# Patient Record
Sex: FEMALE | Race: ASIAN | NOT HISPANIC OR LATINO | URBAN - METROPOLITAN AREA
[De-identification: names, ages, dates, MRNs, and addresses within clinical notes are randomized per-mention and may not be internally consistent; named-entity substitution may affect disease eponyms.]

---

## 2020-02-04 ENCOUNTER — EMERGENCY (EMERGENCY)
Facility: HOSPITAL | Age: 20
LOS: 1 days | Discharge: HOME | End: 2020-02-04
Attending: EMERGENCY MEDICINE
Payer: SELF-PAY

## 2020-02-04 PROCEDURE — 99283 EMERGENCY DEPT VISIT LOW MDM: CPT

## 2020-02-04 PROCEDURE — 99053 MED SERV 10PM-8AM 24 HR FAC: CPT

## 2020-02-05 VITALS
WEIGHT: 134.92 LBS | OXYGEN SATURATION: 99 % | DIASTOLIC BLOOD PRESSURE: 84 MMHG | HEART RATE: 89 BPM | SYSTOLIC BLOOD PRESSURE: 125 MMHG | TEMPERATURE: 98 F | HEIGHT: 64 IN | RESPIRATION RATE: 16 BRPM

## 2020-02-05 PROCEDURE — 90715 TDAP VACCINE 7 YRS/> IM: CPT

## 2020-02-05 PROCEDURE — 99283 EMERGENCY DEPT VISIT LOW MDM: CPT | Mod: 25

## 2020-02-05 PROCEDURE — 73130 X-RAY EXAM OF HAND: CPT | Mod: 26,RT

## 2020-02-05 PROCEDURE — 90471 IMMUNIZATION ADMIN: CPT

## 2020-02-05 PROCEDURE — 73130 X-RAY EXAM OF HAND: CPT

## 2020-02-05 RX ORDER — TETANUS TOXOID, REDUCED DIPHTHERIA TOXOID AND ACELLULAR PERTUSSIS VACCINE, ADSORBED 5; 2.5; 8; 8; 2.5 [IU]/.5ML; [IU]/.5ML; UG/.5ML; UG/.5ML; UG/.5ML
0.5 SUSPENSION INTRAMUSCULAR ONCE
Refills: 0 | Status: COMPLETED | OUTPATIENT
Start: 2020-02-05 | End: 2020-02-05

## 2020-02-05 RX ORDER — IBUPROFEN 200 MG
1 TABLET ORAL
Qty: 20 | Refills: 0
Start: 2020-02-05

## 2020-02-05 RX ORDER — IBUPROFEN 200 MG
400 TABLET ORAL ONCE
Refills: 0 | Status: COMPLETED | OUTPATIENT
Start: 2020-02-05 | End: 2020-02-05

## 2020-02-05 RX ORDER — BACITRACIN ZINC 500 UNIT/G
1 OINTMENT IN PACKET (EA) TOPICAL
Qty: 1 | Refills: 0
Start: 2020-02-05 | End: 2020-02-09

## 2020-02-05 RX ADMIN — Medication 1 TABLET(S): at 02:46

## 2020-02-05 RX ADMIN — Medication 400 MILLIGRAM(S): at 02:46

## 2020-02-05 RX ADMIN — TETANUS TOXOID, REDUCED DIPHTHERIA TOXOID AND ACELLULAR PERTUSSIS VACCINE, ADSORBED 0.5 MILLILITER(S): 5; 2.5; 8; 8; 2.5 SUSPENSION INTRAMUSCULAR at 02:47

## 2020-02-05 NOTE — ED PROVIDER NOTE - CARE PROVIDER_API CALL
Isidro Hicks)  Orthopaedic Surgery  [id number] [place] [address]  Phone: [phone]  Fax: [phone]  Follow Up Time:

## 2020-02-05 NOTE — ED PROVIDER NOTE - OBJECTIVE STATEMENT
18 y/o female with no significant PMHx presents to the ED due to dog bite. 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 When pt went to  one of the treats the dog bit her right hand. The orthopedic surgeon cleaned the wound and placed a stitch, but pt decided to come to the ED due to the pain. Tetanus status unknown. Pt was not started on Abx.

## 2020-02-05 NOTE — ED ADULT NURSE NOTE - OBJECTIVE STATEMENT
The patient presents with right hand injury due to dog bite last night at 20:00.  The patient states that she was bit by her boss' dog (orthopedic surgeon).  She was petting the dog and it bit her on the hand.  The patient received stiches by the surgeon.

## 2020-02-05 NOTE — ED PROVIDER NOTE - PHYSICAL EXAMINATION
Superficial laceration to right dorsal aspect of right hand, minimal surrounding erythema, 1 interrupted suture intact with good approximation of laceration.  distal radial and ulnar pulses intact, cap refill < 2 seconds, full range of motion of arm +elbow flexion/extension/supination and pronation intact, +flexion and extension of all digits at DIP and PIP.

## 2020-02-05 NOTE — ED ADULT TRIAGE NOTE - CHIEF COMPLAINT QUOTE
dog bite to right anterior hand from his orthopedic doctor, bite was stitched right after  by her doctor

## 2020-02-05 NOTE — ED PROVIDER NOTE - CLINICAL SUMMARY MEDICAL DECISION MAKING FREE TEXT BOX
Pt is neurovascularly intact. Rx Augmentin, IBU pt will f/u with Dr. Hicks (ortho). Pt is well appearing, has no new complaints and able to walk with normal gait. Pt is stable for discharge and follow up with medical doctor. Pt educated on care and need for follow up. Discussed anticipatory guidance and return precautions. Questions answered. I had a detailed discussion with the patient and/or guardian regarding the historical points, exam findings, and any diagnostic results supporting the discharge diagnosis.

## 2020-02-05 NOTE — ED PROVIDER NOTE - PATIENT PORTAL LINK FT
You can access the FollowMyHealth Patient Portal offered by [facility] by registering at the following website: [email or web address]. By joining [employer]’[facility] FollowMyHealth portal, you will also be able to view your health information using other applications (apps) compatible with our system.

## 2020-02-05 NOTE — ED PROVIDER NOTE - NSFOLLOWUPINSTRUCTIONS_ED_ALL_ED_FT
Return immediately if you have pain, swelling, numbness, redness, discharge/bleeding, weakness any concerns. Avoid bearing weight or lifting heavy objects with your injured extremity. Follow up with orthopedics/podiatry/primary doctor as instructed. If you need assistance with any follow up appointment call our Care Coordinator at [phone].   Apply cool compresses.  Suture removal in 2 days.